# Patient Record
Sex: FEMALE | Race: OTHER | URBAN - METROPOLITAN AREA
[De-identification: names, ages, dates, MRNs, and addresses within clinical notes are randomized per-mention and may not be internally consistent; named-entity substitution may affect disease eponyms.]

---

## 2021-03-05 PROBLEM — H04.123: Noted: 2021-03-05

## 2021-03-05 PROBLEM — H35.363: Noted: 2021-03-05

## 2021-11-10 NOTE — PATIENT DISCUSSION
Recommend Blepharoplasty after cataract surgery.  Patient states he is not bothered by them at this time.  Monitor for now.

## 2021-12-08 NOTE — PATIENT DISCUSSION
Discussed the risks benefits alternatives and limitations of cataract surgery including infection bleeding loss of vision retinal tears detachment. The patient stated a full understanding and a desire to proceed with the procedure in both eyes. Refractive options were reviewed. Patient has elected to be optimized for distance vision in both eyes. The patient will still need glasses for reading and to possibly fine tune distance vision. Schedule KPE/IOL OD/OS distance aidee and lenstar done today. Declined Toric IOL - discussedDr Ankita Brito to followReturn for an appointment for Admit. with Dr. Haven Hemphill.  Needs [facility] only

## 2022-01-18 NOTE — PATIENT DISCUSSION
1 day PO: Patient is doing well post-operatively.  Small erosion inferiorly at 6 o'clock.  The importance of post-op drop compliance was emphasized. Drop schedule reviewed with patient. Patient to call if any visual changes or concerns.

## 2023-01-09 ENCOUNTER — ESTABLISHED PATIENT (OUTPATIENT)
Dept: URBAN - METROPOLITAN AREA CLINIC 9 | Facility: CLINIC | Age: 88
End: 2023-01-09

## 2023-01-09 DIAGNOSIS — H35.363: ICD-10-CM

## 2023-01-09 DIAGNOSIS — Z79.4: ICD-10-CM

## 2023-01-09 DIAGNOSIS — Z96.1: ICD-10-CM

## 2023-01-09 DIAGNOSIS — E11.3293: ICD-10-CM

## 2023-01-09 DIAGNOSIS — H04.123: ICD-10-CM

## 2023-01-09 PROCEDURE — 92014 COMPRE OPH EXAM EST PT 1/>: CPT

## 2023-01-09 PROCEDURE — 92134 CPTRZ OPH DX IMG PST SGM RTA: CPT

## 2023-01-09 PROCEDURE — 92015 DETERMINE REFRACTIVE STATE: CPT

## 2023-01-09 ASSESSMENT — VISUAL ACUITY
OS_PH: 20/50-1
OS_SC: 20/200
OS_CC: 20/200
OU_CC: 20/50
OU_SC: 20/70
OD_SC: 20/70
OD_CC: 20/50

## 2023-01-09 ASSESSMENT — TONOMETRY
OS_IOP_MMHG: 13
OD_IOP_MMHG: 14

## 2024-01-12 ENCOUNTER — ESTABLISHED PATIENT (OUTPATIENT)
Facility: LOCATION | Age: 89
End: 2024-01-12

## 2024-01-12 DIAGNOSIS — H04.123: ICD-10-CM

## 2024-01-12 DIAGNOSIS — Z96.1: ICD-10-CM

## 2024-01-12 DIAGNOSIS — H35.363: ICD-10-CM

## 2024-01-12 DIAGNOSIS — E11.3293: ICD-10-CM

## 2024-01-12 PROCEDURE — 92134 CPTRZ OPH DX IMG PST SGM RTA: CPT

## 2024-01-12 PROCEDURE — 92015 DETERMINE REFRACTIVE STATE: CPT

## 2024-01-12 PROCEDURE — 92014 COMPRE OPH EXAM EST PT 1/>: CPT

## 2024-01-12 ASSESSMENT — VISUAL ACUITY
OD_SC: 20/50-2
OS_SC: 20/400
OS_CC: 20/400
OD_CC: 20/80+1

## 2024-01-12 ASSESSMENT — TONOMETRY
OD_IOP_MMHG: 12
OS_IOP_MMHG: 12